# Patient Record
Sex: MALE | URBAN - METROPOLITAN AREA
[De-identification: names, ages, dates, MRNs, and addresses within clinical notes are randomized per-mention and may not be internally consistent; named-entity substitution may affect disease eponyms.]

---

## 2017-02-18 ENCOUNTER — APPOINTMENT (RX ONLY)
Dept: URBAN - METROPOLITAN AREA CLINIC 53 | Facility: CLINIC | Age: 82
Setting detail: DERMATOLOGY
End: 2017-02-18

## 2017-02-18 DIAGNOSIS — L29.89 OTHER PRURITUS: ICD-10-CM

## 2017-02-18 DIAGNOSIS — B86 SCABIES: ICD-10-CM

## 2017-02-18 DIAGNOSIS — L29.8 OTHER PRURITUS: ICD-10-CM

## 2017-02-18 PROCEDURE — ? PRESCRIPTION

## 2017-02-18 RX ORDER — HYDROXYZINE HYDROCHLORIDE 10 MG/1
TABLET, FILM COATED ORAL QHS
Qty: 30 | Refills: 3 | Status: ERX | COMMUNITY
Start: 2017-02-18

## 2017-02-18 RX ORDER — KETOCONAZOLE 20 MG/G
CREAM TOPICAL
Qty: 1 | Refills: 0 | Status: ERX | COMMUNITY
Start: 2017-02-18

## 2017-02-18 RX ORDER — PERMETHRIN 50 MG/G
CREAM TOPICAL QD
Qty: 1 | Refills: 0 | Status: ERX | COMMUNITY
Start: 2017-02-18

## 2017-02-18 RX ADMIN — KETOCONAZOLE: 20 CREAM TOPICAL at 19:30

## 2017-02-18 RX ADMIN — HYDROXYZINE HYDROCHLORIDE: 10 TABLET, FILM COATED ORAL at 19:36

## 2017-02-18 RX ADMIN — PERMETHRIN: 50 CREAM TOPICAL at 19:30

## 2017-03-01 ENCOUNTER — APPOINTMENT (RX ONLY)
Dept: URBAN - METROPOLITAN AREA CLINIC 53 | Facility: CLINIC | Age: 82
Setting detail: DERMATOLOGY
End: 2017-03-01

## 2017-03-01 DIAGNOSIS — B86 SCABIES: ICD-10-CM

## 2017-03-01 DIAGNOSIS — D485 NEOPLASM OF UNCERTAIN BEHAVIOR OF SKIN: ICD-10-CM

## 2017-03-01 PROBLEM — D48.5 NEOPLASM OF UNCERTAIN BEHAVIOR OF SKIN: Status: ACTIVE | Noted: 2017-03-01

## 2017-03-01 PROCEDURE — ? TREATMENT REGIMEN

## 2017-03-01 PROCEDURE — 99212 OFFICE O/P EST SF 10 MIN: CPT | Mod: 25

## 2017-03-01 PROCEDURE — 11100: CPT

## 2017-03-01 PROCEDURE — ? COUNSELING

## 2017-03-01 PROCEDURE — 11101: CPT

## 2017-03-01 PROCEDURE — ? SEPARATE AND IDENTIFIABLE DOCUMENTATION

## 2017-03-01 PROCEDURE — ? BIOPSY BY PUNCH METHOD

## 2017-03-01 ASSESSMENT — LOCATION ZONE DERM: LOCATION ZONE: TRUNK

## 2017-03-01 ASSESSMENT — LOCATION SIMPLE DESCRIPTION DERM
LOCATION SIMPLE: CHEST
LOCATION SIMPLE: LEFT LOWER BACK

## 2017-03-01 ASSESSMENT — LOCATION DETAILED DESCRIPTION DERM
LOCATION DETAILED: LEFT LATERAL INFERIOR CHEST
LOCATION DETAILED: LEFT SUPERIOR MEDIAL MIDBACK

## 2017-03-01 NOTE — PROCEDURE: BIOPSY BY PUNCH METHOD
Consent: Written consent was obtained and risks were reviewed including but not limited to scarring, infection, bleeding, scabbing, incomplete removal, nerve damage and allergy to anesthesia.
Billing Type: [employer]
Post-Care Instructions: I reviewed with the patient in detail post-care instructions. Patient is to keep the biopsy site dry overnight, and then apply bacitracin twice daily until healed. Patient may apply hydrogen peroxide soaks to remove any crusting.
Anesthesia Volume In Cc (Will Not Render If 0): 0.8
Epidermal Sutures: 4-0 Nylon
Render Post-Care Instructions In Note?: no
Dressing: bandage
Lab Facility: [address] [place] [address]
Punch Size In Mm: 5
Path Notes (To The Dermatopathologist): Size 5mm\\nR/O Scabies vs. Eczema
Anesthesia Type: 1% lidocaine with epinephrine
Home Suture Removal Text: Patient was provided a home suture removal kit and will remove their sutures at home. If they have any questions or difficulties they will call the office.
X Size Of Lesion In Cm (Optional): 0
Lab: [id number] [facility]
Notification Instructions: Patient will be notified of biopsy results. However, patient instructed to call the office if not contacted within 2 weeks.
Wound Care: Bacitracin
Hemostasis: None
Body Location Override (Optional - Billing Will Still Be Based On Selected Body Map Location If Applicable): Left Mid Back
Biopsy Type: H and E
Detail Level: Detailed
Epidermal Sutures: 5-0 Nylon
Body Location Override (Optional - Billing Will Still Be Based On Selected Body Map Location If Applicable): Left Lateral Chest Wall
Billing Type: Third-Party Bill
Lab: [id number]
Lab Facility: [id number]
Home Suture Removal Text: Patient was provided a home suture removal kit and will remove their sutures at home.  If they have any questions or difficulties they will call the office.

## 2017-03-02 ENCOUNTER — RX ONLY (OUTPATIENT)
Age: 82
Setting detail: RX ONLY
End: 2017-03-02

## 2017-03-02 RX ORDER — TRIAMCINOLONE ACETONIDE 0.5 MG/G
OINTMENT TOPICAL
Qty: 1 | Refills: 0 | Status: ERX | COMMUNITY
Start: 2017-03-02

## 2017-03-03 ENCOUNTER — RX ONLY (OUTPATIENT)
Age: 82
Setting detail: RX ONLY
End: 2017-03-03

## 2017-03-03 RX ORDER — TRIAMCINOLONE ACETONIDE 0.5 MG/G
OINTMENT TOPICAL
Qty: 1 | Refills: 0 | Status: ERX

## 2017-03-07 ENCOUNTER — RX ONLY (OUTPATIENT)
Age: 82
Setting detail: RX ONLY
End: 2017-03-07

## 2017-03-07 RX ORDER — TRIAMCINOLONE ACETONIDE 0.5 MG/G
OINTMENT TOPICAL
Qty: 1 | Refills: 0 | Status: ERX

## 2017-03-07 RX ORDER — HYDROXYZINE HYDROCHLORIDE 10 MG/1
TABLET, FILM COATED ORAL QHS
Qty: 30 | Refills: 3 | Status: ERX

## 2017-03-16 ENCOUNTER — RX ONLY (OUTPATIENT)
Age: 82
Setting detail: RX ONLY
End: 2017-03-16

## 2017-03-16 RX ORDER — TRIAMCINOLONE ACETONIDE 0.5 MG/G
OINTMENT TOPICAL
Qty: 2 | Refills: 0 | Status: ERX

## 2017-11-16 ENCOUNTER — HOSPITAL ENCOUNTER (INPATIENT)
Dept: HOSPITAL 36 - ER | Age: 82
LOS: 12 days | Discharge: HOME | DRG: 885 | End: 2017-11-28
Attending: PSYCHIATRY & NEUROLOGY | Admitting: PSYCHIATRY & NEUROLOGY
Payer: MEDICARE

## 2017-11-16 VITALS — SYSTOLIC BLOOD PRESSURE: 146 MMHG | DIASTOLIC BLOOD PRESSURE: 72 MMHG

## 2017-11-16 DIAGNOSIS — F29: ICD-10-CM

## 2017-11-16 DIAGNOSIS — D75.9: ICD-10-CM

## 2017-11-16 DIAGNOSIS — F03.91: ICD-10-CM

## 2017-11-16 DIAGNOSIS — E87.1: ICD-10-CM

## 2017-11-16 DIAGNOSIS — I48.91: ICD-10-CM

## 2017-11-16 DIAGNOSIS — C83.30: ICD-10-CM

## 2017-11-16 DIAGNOSIS — N39.0: ICD-10-CM

## 2017-11-16 DIAGNOSIS — M10.9: ICD-10-CM

## 2017-11-16 DIAGNOSIS — M19.90: ICD-10-CM

## 2017-11-16 DIAGNOSIS — N18.9: ICD-10-CM

## 2017-11-16 DIAGNOSIS — E03.9: ICD-10-CM

## 2017-11-16 DIAGNOSIS — F48.2: ICD-10-CM

## 2017-11-16 DIAGNOSIS — F39: Primary | ICD-10-CM

## 2017-11-16 LAB
ALBUMIN/GLOB SERPL: 1.5 {RATIO} (ref 1–1.8)
ALP SERPL-CCNC: 74 U/L (ref 34–104)
ALT SERPL-CCNC: 17 U/L (ref 7–52)
ANION GAP SERPL CALC-SCNC: 12 MMOL/L (ref 7–16)
AST SERPL-CCNC: 16 U/L (ref 13–39)
BACTERIA #/AREA URNS HPF: (no result) /HPF
BASOPHILS NFR BLD: 0 % (ref 0–3)
BILIRUB SERPL-MCNC: 0.4 MG/DL (ref 0.3–1)
BILIRUB UR-MCNC: NEGATIVE MG/DL
BUN SERPL-MCNC: 48 MG/DL (ref 7–25)
BUN/CREAT SERPL: 26.7
CALCIUM SERPL-MCNC: 8.9 MG/DL (ref 8.6–10.3)
CHLORIDE SERPL-SCNC: 104 MEQ/L (ref 98–107)
CHOLEST SERPL-MCNC: 125 MG/DL (ref ?–200)
CO2 SERPL-SCNC: 20.2 MEQ/L (ref 21–31)
COLOR UR: YELLOW
CREAT SERPL-MCNC: 1.8 MG/DL (ref 0.7–1.3)
EOSINOPHIL NFR BLD: 4 % (ref 0–5)
EPI CELLS URNS QL MICRO: (no result) /LPF
ERYTHROCYTE [DISTWIDTH] IN BLOOD BY AUTOMATED COUNT: 14.6 % (ref 11.5–20)
GLOBULIN SER-MCNC: 2.7 GM/DL
GLUCOSE SERPL-MCNC: 107 MG/DL (ref 70–105)
GLUCOSE UR STRIP-MCNC: NEGATIVE MG/DL
HCT VFR BLD CALC: 31.3 % (ref 41–60)
HGB BLD-MCNC: 10.5 GM/DL (ref 12–16)
KETONES UR STRIP-MCNC: NEGATIVE MG/DL
MCH RBC QN AUTO: 29.7 PG (ref 27–31)
MCHC RBC AUTO-ENTMCNC: 33.6 PG (ref 28–36)
MCV RBC AUTO: 88.6 FL (ref 80–99)
NEUTROPHILS NFR BLD AUTO: 20 % (ref 40–80)
NEUTS BAND NFR BLD: 0 % (ref 0–10)
PH UR STRIP: 5.5 [PH] (ref 4.6–8)
PLAT MORPH BLD: NORMAL
PLATELET # BLD EST: ADEQUATE 10*3/UL
PLATELET # BLD: 114 TH/CMM (ref 150–400)
PMV BLD AUTO: 7.7 FL
POTASSIUM SERPL-SCNC: 4.2 MEQ/L (ref 3.5–5.1)
PROT UR STRIP-MCNC: NEGATIVE MG/DL
RBC # BLD AUTO: 3.53 MIL/CMM (ref 3.8–5.8)
RBC # UR STRIP: (no result) /UL
RBC MORPH BLD: NORMAL
SODIUM SERPL-SCNC: 132 MEQ/L (ref 136–145)
TOTAL CELLS COUNTED BLD: 100
TRIGL SERPL-MCNC: 157 MG/DL (ref ?–150)
UROBILINOGEN UR STRIP-ACNC: 0.2 E.U./DL (ref 0.2–1)
WBC # BLD AUTO: 17.8 TH/CMM (ref 4.8–10.8)
WBC #/AREA URNS HPF: (no result) /HPF (ref 0–5)

## 2017-11-16 PROCEDURE — Z7610: HCPCS

## 2017-11-16 PROCEDURE — G0410 GRP PSYCH PARTIAL HOSP 45-50: HCPCS

## 2017-11-16 RX ADMIN — MAGNESIUM HYDROXIDE SCH: 400 SUSPENSION ORAL at 23:00

## 2017-11-16 NOTE — ED PHYSICIAN CHART
ED Chief Complaint/HPI





- Patient Information


Date Seen:: 11/16/17


Time Seen:: 17:45


Chief Complaint:: increasing confusion and aggressive behavior


History of Present Illness:: 





Patient's been exhibiting increasing confusion and aggressive behavior at his 

skilled nursing facility.  He has been striking out, yelling and screaming.


Historian:: Patient, Medical Records


Review:: Transfer documents Reviewed





ED Review of Systems





- Review of Systems


General/Constitutional: No fever, No chills


Skin: No skin lesions


Head: No headache


Eyes: No loss of vision


ENT: No earache


Neck: No neck pain, No swelling


Cardio Vascular: No chest pain, No palpitations


Pulmonary: No SOB


GI: No nausea, No vomiting, No diarrhea


G/U: No dysuria


Musculoskeletal: No bone or joint pain


Endocrine: No polyuria, No polydipsia


Psychiatric: Prior psych history


Hematopoietic: No bruising


Allergic/Immuno: No urticaria, No angioedema


Neurological: No syncope, No focal symptoms





ED Past Medical History





- Past Medical History


Past Medical History: Other (chronic renal failure; status post cystitis acute 

renal failure; atrial fibrillation; large B-cell lymphoma; pseudo-bulbar 

effectpsuidobulbar affect; gout)


Family History: Other (not available)


Social History: Care Facility


Surgical History: other (unknown)


Psychiatricy History: Dementia (probable dementia)


Medication: Reviewed





Family Medical History





- Family Member


  ** Mother


History Unknown: Yes





ED Physical Exam





- Physical Examination


General/Constitutional: Well-developed, well-nourished, Alert, No distress


Head: Atraumatic


Eyes: Lids, conjuctiva normal, PERRL


Skin: Nl inspection, No rash


ENMT: External ears, nose nl


Other ENMT comments:: 





Upper dentures and some lower teeth missing


Neck: No nuchal rigidity


Respiratory: Nl effort/Exclusion, Clear to Auscultation


Cardio Vascular: RRR


GI: No tenderness/rebounding/guarding


: No CVA tenderness


Extremities: Normal digits & nails


Neuro/Psych: No focal deficits


Misc: No paraspinal tenderness





ED Labs/Radiology/EKG Results





- Lab Results


Results: 





 Laboratory Results - last 24 hr











  11/16/17 11/16/17





  18:09 18:09


 


WBC  17.8 H 


 


RBC  3.53 L 


 


Hgb  10.5 L 


 


Hct  31.3 L 


 


MCV  88.6 


 


MCH  29.7 


 


MCHC Differential  33.6 


 


RDW  14.6 


 


Plt Count  114 L 


 


MPV  7.7 


 


Band Neutrophils %  0 


 


Neutrophils (Manual)  20 L 


 


Lymphocytes  70 H 


 


Monocytes  6 


 


Eosinophils  4 


 


Basophils  0 


 


Platelet Estimate  ADEQUATE 


 


Platelet Morphology  NORMAL 


 


RBC Morph Micro Appear  NORMAL 


 


Sodium   132 L


 


Potassium   4.2


 


Chloride   104


 


Carbon Dioxide   20.2 L


 


Anion Gap   12.0


 


BUN   48 H


 


Creatinine   1.8 H


 


Est GFR ( Amer)   TNP


 


Est GFR (Non-Af Amer)   TNP


 


BUN/Creatinine Ratio   26.7


 


Glucose   107 H


 


Calcium   8.9


 


Total Bilirubin   0.4


 


AST   16


 


ALT   17


 


Alkaline Phosphatase   74


 


Total Protein   6.7


 


Albumin   4.0 L


 


Globulin   2.7


 


Albumin/Globulin Ratio   1.5


 


Triglycerides   157 H


 


Cholesterol   125


 


LDL Cholesterol Direct   82


 


HDL Cholesterol   25














- Radiology Results


Comments:: 





CXR negative





- EKG Interpretations


Rate & Rhythm: atrial fibrillation with a rate of 117


Axis: normal axis





ED Assessment





- Assessment


General Assessment: 





Patient's leukocytosis may be from his B-cell lymphoma especially since he has 

a right shift on the differential.  I discussed the patient's findings with Dr. Miller and he wished the patient admitted to [facility].





ED Septic Shock





- .


Is Septic Shock (SBP<90, OR Lactate>4 mmol\L) present?: No





ED Reassessment (Disposition)





- Reassessment


Reassessment Condition:: Unchanged





- Diagnosis


Diagnosis:: 





Atrial fibrillation with a rapid ventricular response; dementia; leukocytosis; 

urinary tract infection





- Patient Disposition


Admitted to:: [facility]


Spoke to:: Michelle Morales


Admitting Medical Physician:: Michelle Morales


Admitting Psych Physician:: Jessenia Ventura


Condition at Disposition:: Stable, Unchanged





ED Discharge Plan





- Patient Disposition


Instructions:  Psychosis

## 2017-11-17 RX ADMIN — DILTIAZEM HYDROCHLORIDE SCH MG: 120 CAPSULE, COATED, EXTENDED RELEASE ORAL at 10:08

## 2017-11-17 RX ADMIN — Medication SCH: at 10:24

## 2017-11-17 RX ADMIN — ASPIRIN 81 MG SCH: 81 TABLET ORAL at 10:24

## 2017-11-17 RX ADMIN — ASPIRIN 81 MG SCH MG: 81 TABLET ORAL at 10:09

## 2017-11-17 RX ADMIN — Medication SCH TAB: at 10:09

## 2017-11-17 NOTE — DIAGNOSTIC IMAGING REPORT
Portable chest x-ray

Time: 1850 hours



History: Leukocytosis



Allowing for portable technique the heart size is normal. No focal

pulmonary parenchymal processes. No hilar or mediastinal abnormalities.



Impression: No acute abnormalities.

## 2017-11-18 RX ADMIN — Medication SCH: at 08:23

## 2017-11-18 RX ADMIN — DILTIAZEM HYDROCHLORIDE SCH: 120 CAPSULE, COATED, EXTENDED RELEASE ORAL at 08:23

## 2017-11-18 RX ADMIN — ASPIRIN 81 MG SCH: 81 TABLET ORAL at 08:24

## 2017-11-18 RX ADMIN — LEVOTHYROXINE SODIUM SCH: 50 TABLET ORAL at 06:32

## 2017-11-18 NOTE — PROGRESS NOTES
DATE:  11/18/2017



Staff was spoken to.  The patient is interviewed.  Mood is noted to be

irritable.  Affect is constricted.  Insight and judgment noted to be still

impaired.  Impulse control seems to be limited.  The patient is currently on low

dose of lorazepam on a p.r.n. basis.  The patient continues to be agitated and

is not able to contract for safety.  No side effects to the medications are

noted.  In view of his agitation and insomnia, a low dose of the Seroquel has

been added at 12.5 mg at bedtime and the patient is going to be followed up with

the supportive therapy.





DD: 11/18/2017 09:58

DT: 11/18/2017 19:38

JOB# [id number]  [id number]

## 2017-11-18 NOTE — PSYCHOSOCIAL EVALUATION
DATE OF SERVICE:  11/16/2017



IDENTIFYING DATA:  The patient is an 86-year-old male, resident of [facility].  Information was obtained directly interviewing the patient

as well as reviewing the admission papers and they are reliable.



JUSTIFICATION OF HOSPITALIZATION:  The patient is admitted here on a voluntary

basis for his acute agitation and striking out at the staff and being confused.



CHIEF COMPLAINT:  "I am okay."



HISTORY OF PRESENT ILLNESS:  This is the first psychiatric hospitalization to

[facility] for this patient who has been followed up by Dr. Garrett

on an outpatient basis.  The patient is reported to have been diagnosed to have

bipolar disorder and pseudobulbar affect, hypothyroidism and has been getting

lately agitated and has been trying to be aggressive and assaultive towards the

staff members and has been refusing the care and hence the patient has to be

brought over here for stabilization.



PAST PSYCHIATRIC HISTORY:  Details are not known.



MEDICAL HISTORY:  Physical examination was requested by Dr. Morales.



SUBSTANCE ABUSE HISTORY:  None.



PHYSICAL OR SEXUAL ABUSE HISTORY:  None.



LEGAL PROBLEMS:  None at this time.



MENTAL STATUS EXAMINATION:  The patient is confused; however, he is willing to

comply with the treatment.  The patient's coping skills are noted to be very

poor at this time.  The patient has paranoia, but denies any command

hallucinations.  The patient's short and long-term memory are also noted to be

very poor.



DIAGNOSTIC IMPRESSION:

AXIS I:

A.  Mood disorder, not otherwise specified.

B.  Dementia and behavioral changes secondary to it.

AXIS II:  None.

AXIS III:  As per Dr. Morales.



IMMEDIATE TREATMENT PLAN:  The patient is going to be observed on inpatient

unit, provided with supportive psychotherapy.  The patient is going to be

closely monitored.  We encouraged him to participate in the groups and verbalize

the concerns.  Review of the urinalysis indicated that the patient has blood

____ might be positive and the patient is going to be placed on possibly an

antibiotic for the urinary tract infection and in the meantime, we are going to

be closely monitoring the aggressive behavior and a low dose of the Seroquel is

going to be added.



ESTIMATED LENGTH OF STAY:  5-7 days.



DISCHARGE CRITERIA:  When he no longer is a threat to self or others and be able

to cope up with the stress.





DD: 11/17/2017 10:42

DT: 11/18/2017 07:12

JOB# [id number]  [id number]

## 2017-11-19 RX ADMIN — DILTIAZEM HYDROCHLORIDE SCH: 120 CAPSULE, COATED, EXTENDED RELEASE ORAL at 16:41

## 2017-11-19 RX ADMIN — MAGNESIUM HYDROXIDE SCH: 400 SUSPENSION ORAL at 23:24

## 2017-11-19 RX ADMIN — LEVOTHYROXINE SODIUM SCH: 50 TABLET ORAL at 06:41

## 2017-11-19 RX ADMIN — Medication SCH: at 16:41

## 2017-11-19 RX ADMIN — MAGNESIUM HYDROXIDE SCH ML: 400 SUSPENSION ORAL at 23:22

## 2017-11-19 RX ADMIN — ASPIRIN 81 MG SCH: 81 TABLET ORAL at 16:41

## 2017-11-19 NOTE — PROGRESS NOTES
DATE:  11/19/2017



SUBJECTIVE:  Staff was spoken to.  The patient is interviewed.  Mood is noted to

be irritable.  Affect is constricted.  Insight and judgment are noted to be

still impaired.  Impulse control is noted to be poor.  Coping skills are also

noted to be very poor.  The patient has been screaming and yelling and has been

banging on the door stating that he needs to be getting out of here ____ has not

been on any medications.  The patient has no insight into his illness.



ASSESSMENT:  The patient is still psychotic.



PLAN:  To continue the patient with current medications.  I encouraged the

patient to verbalize the concerns rather than to act out.  The patient has no

insight.  The patient is demented, confused, and having difficult time.  The

patient's blood work is abnormal and the patient has been advised to seek

treatment.  Dr. Morales has been contacted with regards to the abnormal lab

results.





DD: 11/19/2017 11:10

DT: 11/19/2017 22:04

JOB# [id number]  [id number]

## 2017-11-20 RX ADMIN — LEVOTHYROXINE SODIUM SCH: 50 TABLET ORAL at 06:44

## 2017-11-20 RX ADMIN — ASPIRIN 81 MG SCH MG: 81 TABLET ORAL at 09:45

## 2017-11-20 RX ADMIN — Medication SCH EACH: at 16:50

## 2017-11-20 RX ADMIN — DILTIAZEM HYDROCHLORIDE SCH MG: 120 CAPSULE, COATED, EXTENDED RELEASE ORAL at 09:47

## 2017-11-20 RX ADMIN — Medication SCH TAB: at 09:44

## 2017-11-20 NOTE — CONSULTATION
DATE OF CONSULTATION:  11/17/2017



INTERNAL MEDICINE CONSULT



REFERRING PHYSICIAN:  Dr. Ventura.



REASON FOR CONSULT:  Medical management.



HISTORY OF PRESENT ILLNESS:  The patient is an 86-year-old  gentleman

with a history of bipolar disorder, pseudobulbar affect, who resides at [facility].  The patient was transferred to the ER given aggressive

behavior towards other.  The patient has a medical history significant for

chronic kidney disease, atrial fib, diffuse B-cell lymphoma, gout,

osteoarthritis, generalized muscle weakness with abnormal gait who as mentioned

above was transferred secondary to psych decompensation.  Pertinent findings on

ER include a white count of 17.8 and H and H of 10/31 with a platelet count of

114, sodium 132, BUN of 48 and creatinine 1.8.  He also had UA consistent with

UTI.  The patient was also noted to be tachy at 117, but since then his heart

rate has improved.  The patient is a poor historian, he is currently in the

dining room but not able to answer any questions.  He appears to be comfortable

and denies any major symptomatology.



PAST MEDICAL HISTORY:  As noted above.  Hypothyroidism.



PAST SURGICAL HISTORY:  Unknown.



FAMILY HISTORY:  Likely noncontributory to this admission.



SOCIAL HISTORY:  Nursing home resident.  Currently no tobacco, ETOH, or illicit

drug usage.



ALLERGIES:  NKDA.



OUTPATIENT MEDICATIONS:  Aspirin 81 daily, colchicine 0.6 three times a day for

gout, cranberry capsule 425 every day, diltiazem  mg once a day, docusate

sodium 100 mg every day, Dulcolax 10 mg per rectum every day p.r.n. for

constipation, Fleet Enema every couple days p.r.n. for severe constipation,

levothyroxine 25 mcg daily, melatonin 3 mg every day, milk of magnesia p.r.n.,

multivitamins and minerals, Tylenol 325 p.r.n. for pain.



REVIEW OF SYSTEMS:  A good review of systems was not able to be done given the

patient is a poor historian, but he denies any fever, chills or any chest pain.



PHYSICAL EXAMINATION:

VITAL SIGNS:  Temperature 98.6, he has been afebrile, pulse initially above 100

and currently 88, blood pressure 119/70, respirations 19-20, satting 97% on room

air.

GENERAL:  He is a well-developed, well-nourished gentleman, currently sitting

down reading newspaper.  Awake and in no distress.

HEAD AND NECK:  Normocephalic, atraumatic.  Extraocular movements are intact.

NECK:  There is no JVD or LAD.

CARDIOVASCULAR:  Irregularly irregular rhythm with distant sounds.

RESPIRATORY:  Lungs are clear to auscultation bilaterally.

ABDOMEN:  Soft, supple, nontender, nondistended, normoactive bowel sounds.

EXTREMITIES:  Lower extremities:  No pedal edema.

NEUROLOGIC:  Appears to be grossly intact.  Cranial nerves 2-12 are within

normal limits, was unable to test for gait or balance.



LABORATORY DATA:  White count 17.8, H and H 10/31 with a platelet count of 114,

neutrophils 20%, lymphocytes 70%.  Sodium 132, potassium 4.2, chloride 104, CO2

20, BUN 48, creatinine 1.8, glucose 107, lactic acid 1.02, calcium 8.9, total

bili 0.4, AST 16, ALT 17, alk phos 74, albumin 4.0.  Triglycerides 157,

cholesterol 125, LDL 82, HDL 25.  TSH of 14.1.  UA shows large blood, large

leukocyte esterase with 20-50 wbc's.



DIAGNOSTICS:  X-ray, no acute abnormalities and EKG, atrial fibrillation at a

rate of 117.



ASSESSMENT:

1.  Acute psych decompensation with aggressive behavior.

2.  History of bipolar disorder.

3.  History of pseudobulbar affect.

4.  Atrial fibrillation, currently rate controlled.

5.  History of diffuse B-cell lymphoma with elevated WBC count and bicytopenia.

6.  Hyponatremia.

7.  Chronic kidney disease.

8.  Urinary tract infection.

9.  History of gout, which appears to currently be asymptomatic.

10.  History of osteoarthritis.

11.  History of generalized muscle weakness with abnormal gait.

12.  Hypothyroidism with an elevated TSH level.



PLAN:  The patient has been admitted to the psych [facility] given that all his

abnormalities are part of his existing diagnoses.  His leukocytosis as noted

above is secondary to his B-cell lymphoma and he has no signs and symptoms of

infection.  Differential shows a right-sided shift.  For the UTI, the patient

will be placed on ciprofloxacin 250 b.i.d. x 7 days.  He will remain on his

other medications as scheduled including the Cardizem for the atrial fib.  I

will increase his levothyroxine to 50 mcg every day.  He will remain on his

other medications as scheduled.





DD: 11/17/2017 09:37

DT: 11/17/2017 13:21

JOB# [id number]  [id number]

## 2017-11-20 NOTE — PROGRESS NOTES
DATE:  11/20/2017



SUBJECTIVE:  Staff was spoken to.  The patient is interviewed.  Mood is noted to

be irritable.  Affect is constricted.  Insight and judgment are to be still

impaired.  The patient is screaming and yelling, stating that he does not need

any medications, not even the medical medications.  The patient is pacing most

of the time on the unit.



ASSESSMENT:  The patient is still grossly psychotic and impulsive.



PLAN:  To continue the patient with the current medications.  I encouraged the

patient to verbalize the concerns rather than to act out.





DD: 11/20/2017 17:57

DT: 11/20/2017 23:45

JOB# [id number]  [id number]

## 2017-11-21 RX ADMIN — LEVOTHYROXINE SODIUM SCH: 50 TABLET ORAL at 06:52

## 2017-11-21 RX ADMIN — Medication SCH TAB: at 08:05

## 2017-11-21 RX ADMIN — DILTIAZEM HYDROCHLORIDE SCH: 120 CAPSULE, COATED, EXTENDED RELEASE ORAL at 09:34

## 2017-11-21 RX ADMIN — Medication SCH EACH: at 08:07

## 2017-11-21 RX ADMIN — ASPIRIN 81 MG SCH MG: 81 TABLET ORAL at 08:05

## 2017-11-21 NOTE — PROGRESS NOTES
DATE:  11/21/2017



SUBJECTIVE:  Staff was spoken to.  The patient is interviewed.  Mood is noted to

be irritable.  Affect is constricted.  Insight and judgment are noted to be

still impaired.  The patient is screaming and yelling.  The patient has been

consistently refusing to comply with the medications.  The patient has no

insight into his illness.  The patient is screaming and yelling and stating that

he needs to be out of the hospital.  The patient is being treated for urinary

tract infection with antibiotics.  The patient is currently on Cipro and in view

of his agitation, it is decided to start the patient on the Seroquel, which is

going to be given at 12.5 mg twice a day and the patient is going to be followed

up with the supportive therapy.



Please note that the patient is still out of control and paranoid and is in need

of the medication because the patient has been insisting that he should be

discharged.  The patient has been going towards the doors and then trying to

shake them to be open.



ASSESSMENT:  Still agitated and psychotic.



PLAN:  To continue patient with the above changes in the medications and follow

through.





DD: 11/21/2017 09:14

DT: 11/21/2017 21:57

JOB# [id number]  [id number]

## 2017-11-22 RX ADMIN — ASPIRIN 81 MG SCH: 81 TABLET ORAL at 08:24

## 2017-11-22 RX ADMIN — MAGNESIUM HYDROXIDE SCH: 400 SUSPENSION ORAL at 23:25

## 2017-11-22 RX ADMIN — DILTIAZEM HYDROCHLORIDE SCH: 120 CAPSULE, COATED, EXTENDED RELEASE ORAL at 08:24

## 2017-11-22 RX ADMIN — LEVOTHYROXINE SODIUM SCH: 50 TABLET ORAL at 08:24

## 2017-11-22 RX ADMIN — Medication SCH: at 08:24

## 2017-11-22 NOTE — PROGRESS NOTES
DATE:  11/22/2017



PSYCHIATRIC PROGRESS NOTE



SUBJECTIVE:  Staff was spoken to.  The patient is interviewed.  Mood is noted to

be irritable.  Affect is constricted.  The patient is demanding that he should

be discharged and he has to go either to an ER ____ [place] particularly in

[place].  The patient is stating that he has friends over there.  He has to

go and then look for them.  The patient's coping skills are noted to be very

poor.  The patient is still confused and is not making much sense.  The patient

has no insight into his illness.



ASSESSMENT:  The patient is still grossly psychotic.



PLAN:  To increase the dose on the medication to 25 mg twice a day on the

Seroquel and closely monitor the patient.





DD: 11/22/2017 18:17

DT: 11/22/2017 22:29

JOB# [id number]  [id number]

## 2017-11-23 RX ADMIN — Medication SCH: at 08:30

## 2017-11-23 RX ADMIN — LEVOTHYROXINE SODIUM SCH: 50 TABLET ORAL at 06:48

## 2017-11-23 RX ADMIN — ASPIRIN 81 MG SCH: 81 TABLET ORAL at 08:28

## 2017-11-23 RX ADMIN — DILTIAZEM HYDROCHLORIDE SCH: 120 CAPSULE, COATED, EXTENDED RELEASE ORAL at 08:30

## 2017-11-23 NOTE — PROGRESS NOTES
DATE:  11/23/2017



SUBJECTIVE:  Staff was spoken to.  The patient is interviewed.  Mood is noted to

be irritable.  Affect is constricted.  Insight and judgment are noted to be

still impaired.  Impulse control is very poor.  The patient is insisting that

she should lead to either [place] or [place].  The patient has no clue who

is stating [place] or [place].  The patient has no way of supporting

himself.



ASSESSMENT:  The patient is still grossly psychotic and impulsive.



PLAN:  To continue the patient with the supportive therapy, encouraged the

patient to oblige the concerns rather than act out.





DD: 11/23/2017 10:24

DT: 11/23/2017 10:54

[facility]# [id number]  [id number]

## 2017-11-24 RX ADMIN — DILTIAZEM HYDROCHLORIDE SCH: 120 CAPSULE, COATED, EXTENDED RELEASE ORAL at 09:15

## 2017-11-24 RX ADMIN — LEVOTHYROXINE SODIUM SCH: 50 TABLET ORAL at 06:29

## 2017-11-24 RX ADMIN — Medication SCH: at 09:15

## 2017-11-24 RX ADMIN — ASPIRIN 81 MG SCH: 81 TABLET ORAL at 09:15

## 2017-11-24 NOTE — PROGRESS NOTES
DATE:  11/24/2017



PSYCHIATRIC PROGRESS NOTE



SUBJECTIVE:  Staff was spoken to.  The patient is interviewed.  Mood is noted to

be irritable.  Affect is constricted.  Insight and judgment is noted to be still

impaired.  Impulse control seems to be poor.  The patient has been agitated. 

The patient is currently on 25 mg twice a day of the Seroquel, but he is still

insisting on that he should leave and has been trying to bang on the doors.  The

patient has no insight into his illness.



ASSESSMENT:  The patient is still psychotic.



PLAN:  To continue the patient with current medications and followup.





DD: 11/24/2017 13:03

DT: 11/24/2017 22:11

JOB# [id number]  [id number]

## 2017-11-25 RX ADMIN — MAGNESIUM HYDROXIDE SCH: 400 SUSPENSION ORAL at 23:02

## 2017-11-25 RX ADMIN — ASPIRIN 81 MG SCH: 81 TABLET ORAL at 08:51

## 2017-11-25 RX ADMIN — Medication SCH: at 08:51

## 2017-11-25 RX ADMIN — DILTIAZEM HYDROCHLORIDE SCH: 120 CAPSULE, COATED, EXTENDED RELEASE ORAL at 08:51

## 2017-11-25 RX ADMIN — LEVOTHYROXINE SODIUM SCH: 50 TABLET ORAL at 06:33

## 2017-11-26 RX ADMIN — ASPIRIN 81 MG SCH: 81 TABLET ORAL at 09:00

## 2017-11-26 RX ADMIN — Medication SCH: at 09:00

## 2017-11-26 RX ADMIN — DILTIAZEM HYDROCHLORIDE SCH: 120 CAPSULE, COATED, EXTENDED RELEASE ORAL at 09:08

## 2017-11-26 RX ADMIN — LEVOTHYROXINE SODIUM SCH: 50 TABLET ORAL at 09:08

## 2017-11-26 NOTE — PROGRESS NOTES
DATE:  11/25/2017



SUBJECTIVE:  Staff was spoken to.  The patient is interviewed.  Mood is noted to

be getting easily irritable and angry.  Affect is constricted.  The patient is

stating that he has to go to [place] and he cannot be in here for too long. 

____ very poor.  The patient has no clue how he is going to be supporting

himself.  Family has been looking for placement for this patient.



ASSESSMENT:  The patient is still impulsive and psychotic.



PLAN:  To continue the patient with Seroquel and follow the patient with

supportive therapy.  I encouraged the patient to verbalize the concerns rather

than to act out.





DD: 11/25/2017 12:27

DT: 11/26/2017 02:11

JOB# [id number]  [id number]

## 2017-11-27 RX ADMIN — DILTIAZEM HYDROCHLORIDE SCH MG: 120 CAPSULE, COATED, EXTENDED RELEASE ORAL at 09:27

## 2017-11-27 RX ADMIN — Medication SCH EACH: at 09:28

## 2017-11-27 RX ADMIN — LEVOTHYROXINE SODIUM SCH: 50 TABLET ORAL at 07:07

## 2017-11-27 RX ADMIN — ASPIRIN 81 MG SCH MG: 81 TABLET ORAL at 09:27

## 2017-11-27 RX ADMIN — Medication SCH TAB: at 09:28

## 2017-11-27 NOTE — PROGRESS NOTES
DATE:  11/27/2017



PSYCHIATRIC PROGRESS NOTE



Staff was spoken to.  The patient is interviewed.  Mood is irritable.  Affect is

constricted.  The patient, however, has not been too intrusive and not getting

banging on the dose.  No side effects to the medications are noted.  The patient

has been so far compliant with the medication.  The patient is currently on 25

mg of the Seroquel and has been able to tolerate the medication.



ASSESSMENT:  The patient's psychosis is resolving.



PLAN:  To continue the patient with the supportive therapy and followup.





DD: 11/27/2017 10:24

DT: 11/27/2017 22:53

JOB# [id number]  [id number]

## 2017-11-27 NOTE — PROGRESS NOTES
DATE:  11/26/2017



SUBJECTIVE:  Staff was spoken to.  The patient is interviewed.  Mood is less

irritable.  Affect is constricted.  The patient is still having problem with

short-term as well as long-term memory.  The patient is still pretty insisting

that he needs to go to [place] or to [place].  The patient is reported to

have been possibly going to [place].  The patient ____ is very poor. 

The patient has both confusion and psychosis.



PLAN:  To continue the patient with the supportive therapy.  To continue the

patient's current medications and the aggressive behavior seems to be coming

under control.





DD: 11/26/2017 15:21

DT: 11/27/2017 02:10

JOB# [id number]  [id number]

## 2017-11-28 RX ADMIN — Medication SCH: at 09:40

## 2017-11-28 RX ADMIN — DILTIAZEM HYDROCHLORIDE SCH: 120 CAPSULE, COATED, EXTENDED RELEASE ORAL at 09:41

## 2017-11-28 RX ADMIN — ASPIRIN 81 MG SCH: 81 TABLET ORAL at 09:39

## 2017-11-28 RX ADMIN — LEVOTHYROXINE SODIUM SCH: 50 TABLET ORAL at 07:01

## 2018-03-22 ENCOUNTER — HOSPITAL ENCOUNTER (INPATIENT)
Dept: HOSPITAL 36 - ER | Age: 83
LOS: 7 days | Discharge: NURSING, REHAB OR LONG-TERM CARE | DRG: 885 | End: 2018-03-29
Attending: PSYCHIATRY & NEUROLOGY | Admitting: PSYCHIATRY & NEUROLOGY
Payer: MEDICARE

## 2018-03-22 DIAGNOSIS — N18.9: ICD-10-CM

## 2018-03-22 DIAGNOSIS — I48.91: ICD-10-CM

## 2018-03-22 DIAGNOSIS — C83.30: ICD-10-CM

## 2018-03-22 DIAGNOSIS — F23: Primary | ICD-10-CM

## 2018-03-22 DIAGNOSIS — Z79.899: ICD-10-CM

## 2018-03-22 DIAGNOSIS — F31.9: ICD-10-CM

## 2018-03-22 DIAGNOSIS — M10.9: ICD-10-CM

## 2018-03-22 DIAGNOSIS — N39.0: ICD-10-CM

## 2018-03-22 DIAGNOSIS — Z66: ICD-10-CM

## 2018-03-22 DIAGNOSIS — R26.81: ICD-10-CM

## 2018-03-22 DIAGNOSIS — F03.91: ICD-10-CM

## 2018-03-22 DIAGNOSIS — E03.9: ICD-10-CM

## 2018-03-22 DIAGNOSIS — M19.90: ICD-10-CM

## 2018-03-22 LAB
AMPHET UR-MCNC: NEGATIVE NG/ML
APPEARANCE UR: (no result)
BACTERIA #/AREA URNS HPF: (no result) /HPF
BARBITURATES UR-MCNC: NEGATIVE UG/ML
BENZODIAZEPINES PNL UR: NEGATIVE
BILIRUB UR-MCNC: NEGATIVE MG/DL
CANNABINOIDS SERPL QL CFM: NEGATIVE
COCAINE METAB.OTHER UR-MCNC: NEGATIVE NG/ML
COLOR UR: YELLOW
EPI CELLS URNS QL MICRO: (no result) /LPF
GLUCOSE UR STRIP-MCNC: NEGATIVE MG/DL
KETONES UR STRIP-MCNC: NEGATIVE MG/DL
LEUKOCYTE ESTERASE UR-ACNC: (no result)
METHADONE UR CFM-MCNC: NEGATIVE NG/ML
METHAMPHET UR QL: NEGATIVE
MICRO URNS: YES
NITRITE UR QL STRIP: POSITIVE
OPIATES UR QL: NEGATIVE
PCP UR-MCNC: NEGATIVE UG/L
PH UR STRIP: 5.5 [PH] (ref 4.6–8)
PROT UR STRIP-MCNC: (no result) MG/DL
RBC # UR STRIP: (no result) /UL
SP GR UR STRIP: 1.02 (ref 1–1.03)
TRICYCLICS UR QL: NEGATIVE
URINALYSIS COMPLETE PNL UR: (no result)
UROBILINOGEN UR STRIP-ACNC: 0.2 E.U./DL (ref 0.2–1)
WBC #/AREA URNS HPF: (no result) /HPF (ref 0–5)

## 2018-03-22 PROCEDURE — G0410 GRP PSYCH PARTIAL HOSP 45-50: HCPCS

## 2018-03-22 PROCEDURE — Z7610: HCPCS

## 2018-03-22 NOTE — ED PHYSICIAN CHART
ED Chief Complaint/HPI





- Patient Information


Date Seen:: 03/22/18


Time Seen:: 15:15


Chief Complaint:: increased aggressiveness and agitation


History of Present Illness:: 





Patient was sent here from his skilled nursing facility for aggressive and 

combative behavior.


Allergies:: 


 Allergies











Allergy/AdvReac Type Severity Reaction Status Date / Time


 


No Known Allergies Allergy   Verified 11/16/17 17:54











Vitals:: 


 Vital Signs - 8 hr











  03/22/18





  15:11


 


Temp 98.1 F


 


HR 63


 


RR 17


 


/70


 


O2 Sat % 100











Historian:: Patient


Review:: Nurse's Note Reviewed, Transfer documents Reviewed





ED Review of Systems





- Review of Systems


General/Constitutional: No fever, No chills, No weight loss, No weakness, No 

diaphoresis, No edema, No loss of appetite


Skin: No skin lesions, No rash, No bruising


Head: No headache, No light-headedness


Eyes: No loss of vision, No pain, No diplopia


ENT: No earache, No nasal drainage, No sore throat, No tinnitus


Neck: No neck pain, No swelling, No thyromegaly, No stiffness, No mass noted


Cardio Vascular: No chest pain, No palpitations, No PND, No orthopnea, No edema


Pulmonary: No SOB, No cough, No sputum, No wheezing


GI: No nausea, No vomiting, No diarrhea, No pain, No melena, No hematochezia, 

No constipation, No hematemesis


G/U: No dysuria, No frequency, No hematuria


Musculoskeletal: No bone or joint pain, No back pain, No muscle pain


Endocrine: No polyuria, No polydipsia


Psychiatric: No depression, Other (history of psychosis)


Hematopoietic: No bruising, No lymphadenopathy


Allergic/Immuno: No urticaria, No angioedema


Neurological: No syncope, No focal symptoms, No weakness, No paresthesia, No 

headache, No seizure, No dizziness, No confusion, No vertigo





ED Past Medical History





- Past Medical History


Past Medical History: Other (history of urinary tract infection; atrial 

fibrillation; osteoarthritis; chronic renal disease; large B cell lymphoma; 

bipolar disorder)


Family History: Other (not available)


Social History: Care Facility


Psychiatricy History: Other (psychosis)


Medication: Reviewed





Family Medical History





- Family Member


  ** Mother


History Unknown: Yes


Ethnicity: Unknown


Living Status: Unknown


Hx Family Cancer:  (unkown)


Hx Family Coronary Artery Disease:  (unkown)


Hx Family Congestive Heart Failure:  (unkown)


Hx Family Hypertension:  (unkown)


Hx Family Stroke:  (unkown)


Hx Family Diabetes:  (unkown)


Hx Family Seizures:  (unkown)


Hx Family Dementia:  (unkown)


Hx Family AIDS:  (unkown)


Hx Family HIV: No


Hx Family COPD:  (unkown)


Hx Family Hepatitis:  (unkown)


Hx Family Psychiatric Problems:  (unkown)


Hx Family Tuberculosis:  (unkown)





ED Physical Exam





- Physical Examination


General/Constitutional: Well-developed, well-nourished, Alert, No distress, 

Ambulatory


Other Gen/Cons comments:: 





Alert and oriented to the correct year only


Head: Atraumatic


Eyes: Lids, conjuctiva normal, PERRL


Skin: Nl inspection, No rash, No skin lesions, No ecchymosis


ENMT: External ears, nose nl


Neck: No nuchal rigidity


Respiratory: Nl effort/Exclusion, Clear to Auscultation


Other Cardio Vascular comments:: 





Irregular rapid rhythm


GI: No tenderness/rebounding/guarding, No organomegaly


: No CVA tenderness


Extremities: Normal digits & nails


Neuro/Psych: No focal deficits





ED Labs/Radiology/EKG Results





- EKG Interpretations


Rate & Rhythm: atrial fibrillation/flutter with a rate of 100


Axis: normal


Comments:: 





Premature ventricular contraction noted





ED Septic Shock





- .


Is Septic Shock (SBP<90, OR Lactate>4 mmol\L) present?: No





- <6hrs of presentation:


Vital Signs: 


 Vital Signs - 8 hr











  03/22/18





  15:11


 


Temp 98.1 F


 


HR 63


 


RR 17


 


/70


 


O2 Sat % 100














ED Reassessment (Disposition)





- Reassessment


Reassessment:: 





Patient refuses laboratory tests





- Diagnosis


Diagnosis:: 





Bipolar disorder; agitation





- Patient Disposition


Admitted to:: [facility]


Admitting Medical Physician:: Michelle Morales


Admitting Psych Physician:: Jessenia Ventura


Condition at Disposition:: Stable, Unchanged





ED Discharge Plan





- Patient Disposition


Instructions:  Psychosis

## 2018-03-23 RX ADMIN — LEVOTHYROXINE SODIUM SCH: 50 TABLET ORAL at 06:53

## 2018-03-23 RX ADMIN — ASPIRIN 81 MG SCH MG: 81 TABLET ORAL at 10:00

## 2018-03-23 RX ADMIN — LEVOTHYROXINE SODIUM SCH MG: 50 TABLET ORAL at 06:48

## 2018-03-23 NOTE — HISTORY & PHYSICAL
ADMIT DATE:     03/23/2018



REFERRING PHYSICIAN:  Dr. Jessenia Ventura.



REASON FOR CONSULT:  Medical management.



HISTORY OF PRESENT ILLNESS:  The patient is an 87-year-old  male who

was last admitted to this facility on 11/17/2017 for a similar episode of

agitation and aggressive behavior.  The patient has a medical history

significant for chronic kidney disease, atrial fibrillation, diffuse B cell

lymphoma, gout, hypothyroidism, and previous UTIs.  He also, per notes, has a

history of osteoarthritis, generalized muscle weakness and abnormal gait.  He

was transferred to the ER where his significant findings included a UA

consistent with a UTI.  The patient has been admitted to [facility] for

management and care.  The patient currently is refusing any p.o. antibiotics and

states that he feels well.  He is noticeably agitated and does not want to

provide any history at this time.



PAST MEDICAL HISTORY:  As noted above.



PAST SURGICAL HISTORY:  None listed.



FAMILY HISTORY:  Noncontributory to this admission.



SOCIAL HISTORY:   Denies any tobacco, ETOH, or illicit drug usage.  He lives at

a skilled nursing facility.



ALLERGIES:  NKDA.



OUTPATIENT MEDICATIONS:  Tylenol 650 q. 4 hours p.r.n. for pain, Dulcolax 10 mg

per rectum every day, multivitamins every day, cranberry pills 425 every day,

diltiazem 120 every day, Fleet Enema q. 48 hours for severe constipation, milk

of magnesia 30 mL at bedtime p.r.n., multivitamins every day, aspirin 81 every

day, vitamin D3 at 1000 units every day, colchicine 0.6 p.o. b.i.d., docusate

sodium 100 mg every day, Aricept 10 mg at bedtime, Synthroid 50 mcg daily,

Namenda 5 mg daily.



REVIEW OF SYSTEMS:  A great review of systems were not able to be done given the

patient's condition.



PHYSICAL EXAMINATION:

VITAL SIGNS:  Temperature 97.6, pulse 80, respirations 18, /65, satting

95% on room air.

GENERAL:  A well-developed, well-nourished male, not in acute distress.

HEAD AND NECK:  Normocephalic, atraumatic.  Pupils are reactive to light.

NECK:  There is no JVD or LAD.

CARDIAC:  He has irregularly irregular with no murmurs.

LUNGS:  Diminished at bases, otherwise clear to auscultation bilaterally.

ABDOMEN:  Soft, supple, nontender, and nondistended.  Normoactive bowel sounds.

LOWER EXTREMITIES:  There is no pedal edema.



LABORATORY DATA:  The patient apparently refused blood drawn.  Urinalysis shows

positive nitrites, large blood, small leukocyte esterase, 10-25 rbc's, and 25-50

wbc's.  U-tox was negative.



DIAGNOSTICS:  EKG shows atrial fibrillation at a rate of 100.



IMPRESSION AND PLAN:

1.  Acute psych decompensation.

2.  Urinary tract infection.

3.  History of atrial fibrillation, currently rate controlled.

4.  History of chronic renal insufficiency.

5.  Diffuse large B cell lymphoma.

6.  History of hypothyroidism.

7.  History of gout.

8.  History of osteoarthritis.

9.  History of unsteady gait.

10. History of generalized muscle weakness.

11. History of dementia with behavioral d/o.



PLAN:  The patient has been admitted to the [facility] for further

management and care.  He has been kept on all his medications as scheduled.  The

patient is currently refusing ciprofloxacin p.o. despite knowing that he has a

urinary tract infection.  We will follow cultures and sensitivities.





DD: 03/23/2018 10:26

DT: 03/23/2018 14:55

JOB# [id number]  [id number]

HERIBERTO

## 2018-03-23 NOTE — PSYCHOSOCIAL EVALUATION
DATE OF SERVICE:  03/22/2018



IDENTIFYING DATA:  The patient is an 87-year-old male, resident of [facility] in [place], [place].  Information obtained directly interviewing the

patient as well as reviewing the admission papers.



JUSTIFICATION HOSPITALIZATION:  The patient is admitted on a voluntary basis in

view of his aggressive behavior and agitation and disruptive behavior.



CHIEF COMPLAINT:  "I don't know, he need to ask them."



HISTORY OF PRESENT ILLNESS:  This is one of multiple psychiatric

hospitalizations for this patient who was hospitalized under my care on

11/16/2017.  Following the stabilization, the patient was discharged to be

followed up at [facility].  However, for the past couple of weeks, the

patient has been getting out of control and could not be contained.  The patient

has been screaming and yelling.  The patient has been placed on the medications,

but medication does not seem to be containing the patient's behavior.



PAST PSYCHIATRIC HISTORY:  Please refer to the above.



MEDICAL HISTORY:  Physical examination is requested to be done by Dr. Morales.



SUBSTANCE ABUSE HISTORY:  None.



PHYSICAL OR SEXUAL ABUSE HISTORY:  None.



LEGAL PROBLEMS:  None at this time.



STRENGTH AND ASSETS:  The patient is motivated.



MENTAL STATUS EXAMINATION:  The patient is an 87-year-old, looking his stated

age, superficially cooperative.  Eye contact is poor.  Mood is noted to be

irritable.  Affect is constricted.  Insight and judgment are noted to be still

impaired.  Impulse control seems to be poor.  Coping skills are noted to be very

poor.  The patient has been yelling at me and the patient's attention span and

concentration are noted to be very poor at this time.  The patient has been

having difficult time to cope with the stress.  The patient is currently on

p.r.n. doses of the medications and the patient also has been on the donepezil

10 mg and the patient is going to be continued with close monitoring with the

lorazepam and a low dose of the Seroquel is going to be added at night time and

patient is going to be followed up.



DIAGNOSTIC IMPRESSION:

AXIS I:  Psychotic disorder, not otherwise specified.

AXIS II:  None.

AXIS III:  As per Dr. Morales.



IMMEDIATE TREATMENT PLAN:  The patient is going to be closely monitored and once

stabilized, the patient is going to be discharged to [facility] to be followed up on

an outpatient basis.





DD: 03/23/2018 10:56

DT: 03/23/2018 22:48

[facility]# [id number]  [id number]

## 2018-03-24 RX ADMIN — Medication SCH: at 09:04

## 2018-03-24 RX ADMIN — LEVOTHYROXINE SODIUM SCH: 50 TABLET ORAL at 06:58

## 2018-03-24 RX ADMIN — DILTIAZEM HYDROCHLORIDE SCH: 120 CAPSULE, COATED, EXTENDED RELEASE ORAL at 09:04

## 2018-03-24 RX ADMIN — ASPIRIN 81 MG SCH: 81 TABLET ORAL at 09:05

## 2018-03-24 NOTE — CONSULTATION
DATE OF CONSULTATION:     03/24/2018



TYPE OF CONSULTATION:  Psychology.



REQUESTING PHYSICIAN:  Jessenia Ventura M.D.



HISTORY OF PRESENT ILLNESS:  The patient is an 87-year-old male, who is a

resident of [facility] in [facility].  The patient is known to

this writer from previous hospitalizations.  The following is by review of the

record and by patient's self report.  The patient is being admitted due to

aggressive behavior and agitation.  The patient was last seen by this writer on

11/16/2017.  The staff at the patient's facility stated that he was displaying

episodes of screaming and yelling and became uncontrollable and unredirectable. 

The patient denied any suicidal ideation, plan or intention.



PAST MEDICAL HISTORY:  Please see history and physical by Dr. Morales.



PAST PSYCHIATRIC HISTORY:  According to record review, the patient has a history

of psychotic disorder, not otherwise specified and dementia with behavioral

disturbance.  The patient is under the care of a psychiatrist at his facility.



SUBSTANCE ABUSE HISTORY:  None.



CURRENT MEDICATIONS:  Please see admission medication reconciliation.



ALLERGIES:  No known drug allergies.



PSYCHOSOCIAL HISTORY:  The patient is a resident of [facility] and wishes 
to

return there.The patient did not answer questions about occupational

or educational history.  The patient did not answer questions about family

history or family relationships or Yazdanism affiliation.  The patient declined 
to answer

questions about legal issues or physical or sexual abuse history.



MENTAL STATUS EXAMINATION:  The patient appears to be his stated age.  The

patient's attitude is superficially cooperative.  Eye contact is poor.  Speech

is slow and delayed, yet at times loud.  Mood is irritable.  Affect is

constricted.  Thought process shows to be confused and highly distractible. 

The patient denied any auditory or visual hallucinations or any suicidal

ideation, plan or intention.  Behavior on the unit is easliy agitated.  Impulse 
control is poor.  

The patient had episodes of yelling during the clinical interview.  
Concentration is poor.  

The patient was unable to sustain focus and attention.  The patient did not 
participate in

the memory assessment.  Memory seems to be impaired and requires further 
evaluation. 

The patient's sensorium is alert and oriented to person and place only.  The 
patient did 

not participate in the interpretation of proverbs.  Insight is poor.  Judgment 
is impaired.



DIAGNOSTIC IMPRESSION:

AXIS I:  Psychotic disorder, not otherwise specified.

AXIS II:  Deferred.

AXIS III:  Please see history and physical by Dr. Morales.



TREATMENT PLAN:  The patient has been seen by Dr. Ventura for psychiatric

evaluation for the management of the patient's psychotropic medications.  The

patient has been continued on donepezil and lorazepam.  The patient was started

on a low dose of Seroquel at nighttime.  We will provide reality orientation,

reality differentiation, and reality integration.  We will provide de-escalation

for the patient to become compliant and stay compliant with his care and

treatment plan as well as to appropriately interactive with staff versus 
yelling out.  

We will provide Motivational enhancement for the patient to become less agitated

and to provide coping strategies for phase of life issues.  We will continue

supportive therapy throughout the patient's course of treatment.



Thank you, Dr. Ventura for this consult and the opportunity to participate

with you in this patient's care.





DD: 03/24/2018 10:55

DT: 03/24/2018 15:58

JOB# [id number]  [id number]

MTDPIETRO

## 2018-03-24 NOTE — PROGRESS NOTES
DATE:  03/24/2018



SUBJECTIVE:  Staff was spoken to.  The patient is interviewed.  Mood is

irritable.  Affect is constricted.  Insight and judgment are very much impaired.

 Impulse control seems to be poor.  The patient is pacing most of the time on

the unit.  The patient has no insight into his illness.  Continues to be very

paranoid and demented.



ASSESSMENT:  The patient is still impulsive.



PLAN:  To continue the patient with supportive therapy, encouraged the patient

to verbalize the concerns rather than to act out.  The patient is not ready to

be returning to a lower level of care in view of his psychosis and dementia.





DD: 03/24/2018 12:58

DT: 03/24/2018 17:03

JOB# [id number]  [id number]

## 2018-03-25 RX ADMIN — DILTIAZEM HYDROCHLORIDE SCH: 120 CAPSULE, COATED, EXTENDED RELEASE ORAL at 10:29

## 2018-03-25 RX ADMIN — LEVOTHYROXINE SODIUM SCH: 50 TABLET ORAL at 06:36

## 2018-03-25 RX ADMIN — Medication SCH: at 10:28

## 2018-03-25 RX ADMIN — Medication SCH EACH: at 09:57

## 2018-03-25 RX ADMIN — DILTIAZEM HYDROCHLORIDE SCH MG: 120 CAPSULE, COATED, EXTENDED RELEASE ORAL at 09:57

## 2018-03-25 RX ADMIN — ASPIRIN 81 MG SCH MG: 81 TABLET ORAL at 09:58

## 2018-03-25 RX ADMIN — ASPIRIN 81 MG SCH: 81 TABLET ORAL at 10:30

## 2018-03-26 RX ADMIN — DILTIAZEM HYDROCHLORIDE SCH: 120 CAPSULE, COATED, EXTENDED RELEASE ORAL at 08:06

## 2018-03-26 RX ADMIN — LEVOTHYROXINE SODIUM SCH: 50 TABLET ORAL at 06:53

## 2018-03-26 RX ADMIN — Medication SCH: at 08:07

## 2018-03-26 RX ADMIN — ASPIRIN 81 MG SCH: 81 TABLET ORAL at 08:06

## 2018-03-27 RX ADMIN — LEVOTHYROXINE SODIUM SCH MG: 50 TABLET ORAL at 06:48

## 2018-03-27 RX ADMIN — Medication SCH: at 10:11

## 2018-03-27 RX ADMIN — ASPIRIN 81 MG SCH MG: 81 TABLET ORAL at 08:30

## 2018-03-27 RX ADMIN — DILTIAZEM HYDROCHLORIDE SCH: 120 CAPSULE, COATED, EXTENDED RELEASE ORAL at 10:11

## 2018-03-27 NOTE — PROGRESS NOTES
DATE:  03/26/2018



SUBJECTIVE:  Staff was spoken to.  The patient is interviewed.  Mood is noted to

be irritable.  Affect is constricted.  Insight and judgment at this time are

noted to be still impaired.  Impulse control seems to be limited.  The patient

has pain insisting on having his way.  The patient refuses to comply with the

medication.  The patient has been going on a tangent that he needs to be there

with his niece.



ASSESSMENT:  The patient is still impulsive and demented.



PLAN:  To continue the patient with the current medications and followup.





DD: 03/26/2018 19:32

DT: 03/27/2018 01:37

JOB# [id number]  [id number]

## 2018-03-28 RX ADMIN — LEVOTHYROXINE SODIUM SCH MG: 50 TABLET ORAL at 06:38

## 2018-03-28 RX ADMIN — ASPIRIN 81 MG SCH: 81 TABLET ORAL at 09:03

## 2018-03-28 RX ADMIN — DILTIAZEM HYDROCHLORIDE SCH: 120 CAPSULE, COATED, EXTENDED RELEASE ORAL at 09:03

## 2018-03-28 RX ADMIN — Medication SCH: at 09:03

## 2018-03-28 NOTE — PROGRESS NOTES
DATE:  03/27/2018



PSYCHIATRIC PROGRESS NOTE



SUBJECTIVE:  Staff was spoken to.  The patient is interviewed.  Mood is noted to

be less irritable.  Affect is constricted.  The patient has been complaining

with the medication.  The patient has been testing the limit yesterday, but

today he seems to be softening up.  No side effects to medications are noted. 

Sleep is noted to be poor.  Appetite is noted to be improving.



ASSESSMENT:  The patient is still having mood swings.



PLAN:  To continue the patient with the current medications and encouraged the

patient to verbalize the concerns rather than to act out.





DD: 03/27/2018 18:31

DT: 03/28/2018 03:11

JOB# [id number]  [id number]

## 2018-03-28 NOTE — PROGRESS NOTES
DATE:  03/28/2018



SUBJECTIVE:  Staff was spoken to.  The patient is interviewed.  Mood is noted to

be irritable.  Affect is constricted.  Coping skills are noted to be still poor.

 Sleep and appetite are also noted to be very poor.  The patient has been

reluctant to comply with the medications.  No side effects to the medications

are noted.  The patient has been having difficult time to cope with the stress.



ASSESSMENT:  The patient is still psychotic.



PLAN:  To continue the patient with the supportive therapy and followup.





DD: 03/28/2018 10:35

DT: 03/28/2018 22:04

[facility]# [id number]  [id number]

## 2018-03-29 RX ADMIN — DILTIAZEM HYDROCHLORIDE SCH: 120 CAPSULE, COATED, EXTENDED RELEASE ORAL at 10:00

## 2018-03-29 RX ADMIN — ASPIRIN 81 MG SCH: 81 TABLET ORAL at 10:00

## 2018-03-29 RX ADMIN — LEVOTHYROXINE SODIUM SCH: 50 TABLET ORAL at 06:30

## 2018-03-29 RX ADMIN — Medication SCH: at 10:00

## 2018-03-30 NOTE — PROGRESS NOTES
DATE:  03/29/2018



SUBJECTIVE:  Staff was spoken to.  The patient is interviewed.  Mood is noted to

be anxious.  Affect is appropriate.  Not suicidal or homicidal.  Insight and

judgment are noted to be improving.  Impulse control seems to be fair.  No side

effects to medications are noted.  The patient has been able to participate in

groups and verbalize the concerns rather than to act out.  No side effects to

the medications are noted.  The patient's coping skills are noted to be

improving.  Aggressive behavior has been under control.



ASSESSMENT:  The patient is stabilizing.



PLAN:  To discharge the patient today to follow up as an outpatient.





DD: 03/29/2018 19:15

DT: 03/30/2018 05:32

JOB# [id number]  [id number]